# Patient Record
Sex: FEMALE | Race: BLACK OR AFRICAN AMERICAN | NOT HISPANIC OR LATINO | Employment: FULL TIME | URBAN - METROPOLITAN AREA
[De-identification: names, ages, dates, MRNs, and addresses within clinical notes are randomized per-mention and may not be internally consistent; named-entity substitution may affect disease eponyms.]

---

## 2023-02-21 PROBLEM — K64.9 HEMORRHOIDS: Status: ACTIVE | Noted: 2023-02-21

## 2023-02-21 PROBLEM — G89.29 CHRONIC PAIN OF BOTH HIPS: Status: ACTIVE | Noted: 2023-02-21

## 2023-02-21 PROBLEM — M25.551 CHRONIC PAIN OF BOTH HIPS: Status: ACTIVE | Noted: 2023-02-21

## 2023-02-21 PROBLEM — M25.552 CHRONIC PAIN OF BOTH HIPS: Status: ACTIVE | Noted: 2023-02-21

## 2023-03-01 PROBLEM — D50.9 MICROCYTIC ANEMIA: Status: ACTIVE | Noted: 2023-03-01

## 2023-05-11 PROBLEM — K92.2 GASTROINTESTINAL HEMORRHAGE: Status: ACTIVE | Noted: 2023-05-11

## 2023-05-11 PROBLEM — K64.8 HEMORRHOIDS, COMPLICATED: Status: ACTIVE | Noted: 2023-02-21

## 2023-05-24 PROBLEM — M19.90 OSTEOARTHRITIS: Status: ACTIVE | Noted: 2023-05-24

## 2023-05-24 PROBLEM — R73.03 PREDIABETES: Status: ACTIVE | Noted: 2023-05-24

## 2023-08-14 PROBLEM — K92.2 GASTROINTESTINAL HEMORRHAGE: Status: RESOLVED | Noted: 2023-05-11 | Resolved: 2023-08-14

## 2023-10-06 PROBLEM — D50.0 IRON DEFICIENCY ANEMIA DUE TO CHRONIC BLOOD LOSS: Status: ACTIVE | Noted: 2023-10-06

## 2023-12-06 PROBLEM — M89.8X1 PAIN OF LEFT SCAPULA: Status: ACTIVE | Noted: 2023-12-06

## 2023-12-06 PROBLEM — M62.9 MUSCULOSKELETAL DISORDER INVOLVING UPPER TRAPEZIUS MUSCLE: Status: ACTIVE | Noted: 2023-12-06

## 2023-12-06 PROBLEM — M75.82 ROTATOR CUFF TENDINITIS, LEFT: Status: ACTIVE | Noted: 2023-12-06

## 2023-12-06 PROBLEM — M25.612 STIFFNESS OF LEFT SHOULDER JOINT: Status: ACTIVE | Noted: 2023-12-06

## 2023-12-18 PROBLEM — M25.512 ACUTE PAIN OF LEFT SHOULDER: Status: ACTIVE | Noted: 2023-12-18

## 2024-02-06 PROBLEM — M67.814 BICEPS TENDONOSIS OF LEFT SHOULDER: Status: ACTIVE | Noted: 2024-02-06

## 2024-04-18 PROBLEM — M25.312 DYSKINESIS OF LEFT SCAPULA: Status: ACTIVE | Noted: 2024-04-18

## 2024-06-19 ENCOUNTER — PATIENT MESSAGE (OUTPATIENT)
Dept: ADMINISTRATIVE | Facility: HOSPITAL | Age: 54
End: 2024-06-19

## 2024-07-05 ENCOUNTER — PATIENT OUTREACH (OUTPATIENT)
Dept: ADMINISTRATIVE | Facility: HOSPITAL | Age: 54
End: 2024-07-05

## 2024-07-05 DIAGNOSIS — Z12.31 BREAST CANCER SCREENING BY MAMMOGRAM: Primary | ICD-10-CM

## 2024-08-28 PROBLEM — D56.3: Status: ACTIVE | Noted: 2023-03-01

## 2024-10-07 ENCOUNTER — ON-DEMAND VIRTUAL (OUTPATIENT)
Dept: URGENT CARE | Facility: CLINIC | Age: 54
End: 2024-10-07

## 2024-10-07 DIAGNOSIS — G62.9 NEUROPATHY: Primary | ICD-10-CM

## 2024-10-07 NOTE — PROGRESS NOTES
Subjective:      Patient ID: Sena Howard is a 54 y.o. female.    Vitals:  vitals were not taken for this visit.     Chief Complaint: Headache      Visit Type: TELE AUDIOVISUAL    Present with the patient at the time of consultation: TELEMED PRESENT WITH PATIENT: None    Past Medical History:   Diagnosis Date    Fibroids     Hemorrhoids, complicated 2/21/2023    Iron deficiency anemia due to chronic blood loss 10/6/2023    Osteoarthritis 5/24/2023    Prediabetes 5/24/2023     Past Surgical History:   Procedure Laterality Date    HYSTERECTOMY      SHOULDER SURGERY      UTERINE FIBROID SURGERY       Review of patient's allergies indicates:   Allergen Reactions    Adhesive      Other reaction(s): steri strips caused blisters/ scarring    Penicillins     Sulfa (sulfonamide antibiotics) Hives, Itching and Rash     Current Outpatient Medications on File Prior to Visit   Medication Sig Dispense Refill    ascorbic acid (VITAMIN C ORAL) Take 1 tablet by mouth Daily.      cholecalciferol, vitD3,/vit K2 (VITAMIN D3-VITAMIN K2) 250 mcg (10,000 unit)-45 mcg Cap Take 1 tablet by mouth daily as needed.      clindamycin (CLEOCIN T) 1 % external solution Apply 1 application topically daily as needed. (Patient not taking: Reported on 7/11/2024)      clindamycin (CLEOCIN T) 1 % external solution Use once daily (Patient not taking: Reported on 7/31/2024) 60 mL 4    cyanocobalamin, vitamin B-12, 50 mcg tablet Take 1 tablet by mouth once daily.      echinacea 380 mg Cap Take 1 capsule by mouth daily as needed.      fluticasone propionate (FLONASE) 50 mcg/actuation nasal spray 2 sprays (100 mcg total) by Each Nostril route once daily. 16 g 11    MAGNESIUM CARBONATE ORAL Take 1 tablet by mouth Daily.      predniSONE (DELTASONE) 10 MG tablet Take 1 tablet (10 mg total) by mouth once daily. 7 tablet 0    psyllium 0.52 gram capsule Take 0.52 g by mouth once daily.      red beet root-sour cherry ext 250-0.5 mg Chew Take 1 tablet by mouth  Daily.      spironolactone (ALDACTONE) 25 MG tablet Take by mouth.      turmeric 400 mg Cap Take 1 capsule by mouth once daily.      vitamin E 1000 UNIT capsule Take 1 capsule by mouth once daily.       No current facility-administered medications on file prior to visit.     Family History   Problem Relation Name Age of Onset    Breast cancer Neg Hx      Ovarian cancer Neg Hx             [facility] Peq Odvv Intake    10/7/2024 12:44 PM CDT - Filed by Patient   What is your current physical address in the event of a medical emergency? [address] [place]   Are you able to take your vital signs? Yes   Systolic Blood Pressure: 114   Diastolic Blood Pressure: 74   Weight: 143   Height: 66   Pulse: 79   Temperature:    Respiration rate:    Pulse Oxygen:    Please attach any relevant images or files    Is your employer contracted with [facility]? Yes   Please enter your employer supplied coupon code. NA         Patient states visiting from [facility] C/o sharp shooting pain, intermittent , sometimes lasting 20 seconds,   Denies facial drooping, speech or coordination difficulties, changes in vision, dizziness, syncope           Constitution: Negative for chills, sweating, fatigue and fever.   HENT:  Negative for ear pain, ear discharge, tinnitus, hearing loss, congestion, sinus pain, sinus pressure, sore throat, trouble swallowing and voice change.    Neck: Negative for neck pain and painful lymph nodes.   Cardiovascular:  Negative for chest pain, palpitations and sob on exertion.   Respiratory:  Negative for cough, sputum production, shortness of breath and wheezing.    Gastrointestinal:  Negative for abdominal pain, nausea, vomiting and diarrhea.   Musculoskeletal:  Negative for muscle ache.   Skin:  Negative for color change, pale and rash.   Allergic/Immunologic: Negative for sneezing.   Neurological:  Negative for headaches, numbness and tingling.   Hematologic/Lymphatic: Negative for swollen lymph nodes.         Objective:   The physical exam was conducted virtually.  Physical Exam   Constitutional: She is oriented to person, place, and time. No distress.   HENT:   Head: Normocephalic and atraumatic.   Mouth/Throat: Oropharynx is clear and moist and mucous membranes are normal.   Eyes: Conjunctivae are normal. No scleral icterus.   Pulmonary/Chest: Effort normal. No respiratory distress.   Musculoskeletal: Normal range of motion.         General: Normal range of motion.   Neurological: She is alert and oriented to person, place, and time.   Skin: Skin is not diaphoretic.   Psychiatric: Her behavior is normal. Judgment and thought content normal.   Vitals reviewed.      Assessment:     1. Neuropathy      Recommend f/u w in person visit for further evaluation and management     May take NSAIDs or tylenol prn pain    No red flag s&s  Plan:       Neuropathy

## 2025-03-24 PROBLEM — E83.52 HYPERCALCEMIA: Status: ACTIVE | Noted: 2025-03-24

## 2025-06-12 ENCOUNTER — PATIENT MESSAGE (OUTPATIENT)
Dept: ADMINISTRATIVE | Facility: HOSPITAL | Age: 55
End: 2025-06-12
Payer: COMMERCIAL

## 2025-06-18 ENCOUNTER — PATIENT MESSAGE (OUTPATIENT)
Dept: ADMINISTRATIVE | Facility: HOSPITAL | Age: 55
End: 2025-06-18
Payer: COMMERCIAL

## 2025-06-18 ENCOUNTER — PATIENT OUTREACH (OUTPATIENT)
Dept: ADMINISTRATIVE | Facility: HOSPITAL | Age: 55
End: 2025-06-18
Payer: COMMERCIAL